# Patient Record
Sex: MALE | URBAN - NONMETROPOLITAN AREA
[De-identification: names, ages, dates, MRNs, and addresses within clinical notes are randomized per-mention and may not be internally consistent; named-entity substitution may affect disease eponyms.]

---

## 2019-01-21 ENCOUNTER — APPOINTMENT (RX ONLY)
Dept: URBAN - NONMETROPOLITAN AREA CLINIC 13 | Facility: CLINIC | Age: 34
Setting detail: DERMATOLOGY
End: 2019-01-21

## 2019-01-21 DIAGNOSIS — L21.8 OTHER SEBORRHEIC DERMATITIS: ICD-10-CM

## 2019-01-21 PROBLEM — F41.9 ANXIETY DISORDER, UNSPECIFIED: Status: ACTIVE | Noted: 2019-01-21

## 2019-01-21 PROBLEM — L29.8 OTHER PRURITUS: Status: ACTIVE | Noted: 2019-01-21

## 2019-01-21 PROBLEM — L85.3 XEROSIS CUTIS: Status: ACTIVE | Noted: 2019-01-21

## 2019-01-21 PROCEDURE — 99201: CPT

## 2019-01-21 PROCEDURE — ? PRESCRIPTION

## 2019-01-21 PROCEDURE — ? ADDITIONAL NOTES

## 2019-01-21 RX ORDER — MUPIROCIN 20 MG/G
1 OINTMENT TOPICAL
Qty: 1 | Refills: 2 | Status: ERX | COMMUNITY
Start: 2019-01-21

## 2019-01-21 RX ORDER — TRIAMCINOLONE ACETONIDE 0.1 %
1 OINTMENT (GRAM) TOPICAL
Qty: 1 | Refills: 1 | Status: ERX | COMMUNITY
Start: 2019-01-21

## 2019-01-21 RX ADMIN — MUPIROCIN 1: 20 OINTMENT TOPICAL at 23:29

## 2019-01-21 RX ADMIN — Medication 1: at 23:21

## 2019-01-21 ASSESSMENT — LOCATION ZONE DERM: LOCATION ZONE: SCALP

## 2019-01-21 ASSESSMENT — LOCATION SIMPLE DESCRIPTION DERM: LOCATION SIMPLE: SCALP

## 2019-01-21 ASSESSMENT — LOCATION DETAILED DESCRIPTION DERM
LOCATION DETAILED: LEFT CENTRAL POSTAURICULAR SKIN
LOCATION DETAILED: RIGHT INFERIOR POSTAURICULAR SKIN

## 2019-01-21 NOTE — PROCEDURE: ADDITIONAL NOTES
Detail Level: Simple
Additional Notes: Pt will use head and shoulders on scalp and behind ears for 4 minutes
Additional Notes: ON EXAM HE EITHER HAS SEBORRHEIC DERMATITS OR PSORIASIS BEHIND THE EARS.  NO OTHER EXAM FINDINGS OR ARHTRITIS TO SUGGEST PSORAISIS.  CONSIDERED ACD TO GLASSES BUT AREA OF CONTACT IS LOWER THAN WOULD BE EXPECTED.  \\n\\nEXPLAINED THIS UNDERLYING DISEAES IS ALLOWING SECONDARY INFECTION WITH STAPH - THE INFECTION IS NOT THE PRIMARY PROBLEM BUT SECONDARY TO SKIN BREAK DOWN SO PREVENT THE SEB DERM WILL PREVENT THE INFECTION.\\n\\nWILL START ANTIDANDRUFF SHAMPOOS TO SCALP AND BEHIND EARS DAILY- LET SIT X 4 MIN BEFORE RINSING.  START TAC BID X 2 WEEKS THEN BIW FOR MAINTENANCE.  \\n\\nRTC FOR RECHECK IN 3 MONTHS TO ENSURE HE IS DOING WELL, NO E/O ATROPHY, ECT\\n\\nCONSIDER PROTOPIC IF NEEDED AT THAT TIME

## 2019-01-21 NOTE — PROCEDURE: MIPS QUALITY
Quality 402: Tobacco Use And Help With Quitting Among Adolescents: Patient screened for tobacco and is an ex-smoker
Detail Level: Detailed
Quality 226: Preventive Care And Screening: Tobacco Use: Screening And Cessation Intervention: Patient screened for tobacco use and is an ex/non-smoker